# Patient Record
Sex: MALE | Race: WHITE | NOT HISPANIC OR LATINO | Employment: OTHER | URBAN - NONMETROPOLITAN AREA
[De-identification: names, ages, dates, MRNs, and addresses within clinical notes are randomized per-mention and may not be internally consistent; named-entity substitution may affect disease eponyms.]

---

## 2023-08-30 ENCOUNTER — TELEPHONE (OUTPATIENT)
Dept: CARDIOLOGY | Facility: CLINIC | Age: 63
End: 2023-08-30
Payer: MEDICARE

## 2023-08-30 NOTE — LETTER
September 6, 2023       No Recipients    Patient: Andre Witt   YOB: 1960   Date of Visit: 8/30/2023 September 6, 2023       To Whom It May Concern:    We build our practice on integrity and patient care. The highest compliment we can receive is the referral of friends, family and patients to our office. We want to take this time to thank you for considering our group as part of your care team.    The medical records for Andre Witt 1960 were reviewed for pre-operative clearance on 09/06/2023. It has been determined that this patient has:  ACCEPTABLE RISK.    Andre Witt is currently on the following medications that will need to be held prior to surgery: MAY HOLD PLAVIX TO 7 DAYS PRIOR TO PROCEDURE.    This pre-operative evaluation has been completed based on patient reported medical conditions at the date of this letter, this evaluation may have considered in part results of additional testing, completion of an EKG and patient reported symptoms at the time of their visit.    Andre Witt's pre-operative clearance is good for thirty(30) days from the date of this letter with no reported changes in health, symptoms or diagnosis from the patient, their primary care provider or your office.    Your office has reported the patient will under go 10/04/2023 on LEFT TOTAL KNEE REPLACEMENT with Dr. SPEARS. If this appointment is changed or moved outside of thirty(30) days from 09/06/2023 this pre-operative clearance is void.    If you have any further questions please give our office a call.    Thank you for your trust,      PEPPER Sanchez

## 2023-08-30 NOTE — TELEPHONE ENCOUNTER
Received cardiac clearance request from  stating pt has left total knee replacement scheduled for 10/04/2023 and is requiring a cardiac clearance. Placed cardiac clearance request in Shira's inbox to review and address with provider.

## 2023-08-31 ENCOUNTER — OFFICE VISIT (OUTPATIENT)
Dept: CARDIOLOGY | Facility: CLINIC | Age: 63
End: 2023-08-31
Payer: MEDICARE

## 2023-08-31 VITALS
DIASTOLIC BLOOD PRESSURE: 81 MMHG | HEIGHT: 67 IN | WEIGHT: 211.6 LBS | BODY MASS INDEX: 33.21 KG/M2 | HEART RATE: 80 BPM | SYSTOLIC BLOOD PRESSURE: 124 MMHG | OXYGEN SATURATION: 98 %

## 2023-08-31 DIAGNOSIS — I10 ESSENTIAL HYPERTENSION: ICD-10-CM

## 2023-08-31 DIAGNOSIS — I25.10 CORONARY ARTERY DISEASE INVOLVING NATIVE CORONARY ARTERY OF NATIVE HEART WITHOUT ANGINA PECTORIS: Primary | ICD-10-CM

## 2023-08-31 DIAGNOSIS — E78.5 HYPERLIPIDEMIA, UNSPECIFIED HYPERLIPIDEMIA TYPE: ICD-10-CM

## 2023-08-31 RX ORDER — LORATADINE 10 MG/1
10 TABLET ORAL DAILY
COMMUNITY

## 2023-08-31 RX ORDER — FLUTICASONE PROPIONATE 50 MCG
2 SPRAY, SUSPENSION (ML) NASAL DAILY
COMMUNITY

## 2023-08-31 RX ORDER — DICLOFENAC SODIUM 75 MG/1
TABLET, DELAYED RELEASE ORAL
COMMUNITY
Start: 2023-07-24

## 2023-08-31 RX ORDER — ROSUVASTATIN CALCIUM 40 MG/1
TABLET, COATED ORAL
COMMUNITY
Start: 2023-06-19

## 2023-08-31 RX ORDER — LOSARTAN POTASSIUM 50 MG/1
TABLET ORAL
COMMUNITY
Start: 2023-06-19

## 2023-08-31 RX ORDER — CLOPIDOGREL BISULFATE 75 MG/1
TABLET ORAL
COMMUNITY
Start: 2023-06-19

## 2023-08-31 RX ORDER — ALBUTEROL SULFATE 90 UG/1
2 AEROSOL, METERED RESPIRATORY (INHALATION) EVERY 4 HOURS PRN
COMMUNITY

## 2023-08-31 RX ORDER — SODIUM PHOSPHATE,MONO-DIBASIC 19G-7G/118
ENEMA (ML) RECTAL
COMMUNITY

## 2023-08-31 RX ORDER — PAROXETINE 30 MG/1
TABLET, FILM COATED ORAL
COMMUNITY
Start: 2023-06-19

## 2023-08-31 RX ORDER — MELATONIN
1000 DAILY
COMMUNITY

## 2023-08-31 RX ORDER — OMEPRAZOLE 20 MG/1
CAPSULE, DELAYED RELEASE ORAL
COMMUNITY
Start: 2023-06-19

## 2023-08-31 RX ORDER — METOPROLOL SUCCINATE 50 MG/1
TABLET, EXTENDED RELEASE ORAL
COMMUNITY
Start: 2023-06-19

## 2023-08-31 NOTE — PROGRESS NOTES
Subjective     Andre Witt is a 63 y.o. male who presents today for Establish Care (Cardiac Eval.).    CHIEF COMPLIANT  Chief Complaint   Patient presents with    Establish Care     Cardiac Eval.     Problem list:   CAD: STEMI 10/2021: 100% proximal OM1, stented.  RCA  with good collateral filling, residual mid 50% LAD  HTN  HLD  Echo 2/2022: EF improved to 65% ( was 35% on [facility] after STEMI)    Active Problems:  Problem List Items Addressed This Visit    None  Visit Diagnoses       Coronary artery disease involving native coronary artery of native heart without angina pectoris    -  Primary    Relevant Medications    clopidogrel (PLAVIX) 75 MG tablet    metoprolol succinate XL (TOPROL-XL) 50 MG 24 hr tablet    Other Relevant Orders    ECG 12 Lead    Essential hypertension        Relevant Medications    losartan (COZAAR) 50 MG tablet    metoprolol succinate XL (TOPROL-XL) 50 MG 24 hr tablet    Hyperlipidemia, unspecified hyperlipidemia type        Relevant Medications    rosuvastatin (CRESTOR) 40 MG tablet            HPI  HPI  The patient is a 63 year old male with a history of CAD with STEMI in 10/2021 receiving stent to proximal OM 1.  He has done well since that time.  Denies chest pain, SOA, syncope, near syncope, palpitations, orthopnea, PND.  Last echo in 2/2022 showed EF improved to 60-65%.  Initially during cath from STEMi, EF appeared to be 35-40%.  This has recovered per echo prior to discharge.  He is scheduled for knee replacement soon and needs cardiac clearance prior to procedure.  He is somewhat limited by knee but is able to perform normal household work without limitations.  HR and BP stable. EKG shows sinus bradycardia, rate 59, inferior q waves,  no acute ischemic changes.         PRIOR MEDS  Current Outpatient Medications on File Prior to Visit   Medication Sig Dispense Refill    albuterol sulfate  (90 Base) MCG/ACT inhaler Inhale 2 puffs Every 4 (Four) Hours As Needed for Wheezing.       Cholecalciferol 25 MCG (1000 UT) tablet Take 1 tablet by mouth Daily.      clopidogrel (PLAVIX) 75 MG tablet       diclofenac (VOLTAREN) 75 MG EC tablet       fluticasone (FLONASE) 50 MCG/ACT nasal spray 2 sprays into the nostril(s) as directed by provider Daily.      glucosamine-chondroitin 500-400 MG capsule capsule Take  by mouth 3 (Three) Times a Day With Meals.      loratadine (Claritin) 10 MG tablet Take 1 tablet by mouth Daily.      losartan (COZAAR) 50 MG tablet       metoprolol succinate XL (TOPROL-XL) 50 MG 24 hr tablet       omeprazole (priLOSEC) 20 MG capsule       PARoxetine (PAXIL) 30 MG tablet       rosuvastatin (CRESTOR) 40 MG tablet        No current facility-administered medications on file prior to visit.       ALLERGIES  Celebrex [celecoxib] and Codeine    HISTORY  Past Medical History:   Diagnosis Date    Asthma     Heart attack     x2    History of hernia repair     History of total hip replacement     bilateral hips    Hx of blood clots     upper RT thigh toward groin area    Hypertension        Social History     Socioeconomic History    Marital status:    Tobacco Use    Smoking status: Never    Smokeless tobacco: Never   Substance and Sexual Activity    Alcohol use: Never    Drug use: Never    Sexual activity: Defer       Family History   Problem Relation Age of Onset    Stroke Mother         multiple strokes    Diabetes Mother     Diabetes Father     Heart failure Father     Heart disease Father     Heart attack Father         open heart surgery    Prostate cancer Father     Hyperlipidemia Sister     Diabetes Sister     No Known Problems Brother     No Known Problems Maternal Grandmother     No Known Problems Maternal Grandfather     No Known Problems Paternal Grandmother     No Known Problems Paternal Grandfather        Review of Systems   Constitutional:  Positive for fatigue. Negative for chills, diaphoresis and fever.   HENT: Negative.  Positive for tinnitus.    Eyes:  "Negative.  Negative for visual disturbance (glasses).   Respiratory:  Positive for shortness of breath (with exertion) and wheezing. Negative for apnea, cough and chest tightness.    Cardiovascular: Negative.  Negative for chest pain, palpitations and leg swelling.   Gastrointestinal: Negative.  Negative for blood in stool.   Endocrine: Negative.  Negative for cold intolerance and heat intolerance.   Genitourinary: Negative.  Negative for hematuria.   Musculoskeletal:  Positive for arthralgias, back pain and myalgias (calves and hands). Negative for neck pain and neck stiffness.   Skin: Negative.  Negative for color change, rash and wound.   Allergic/Immunologic: Positive for environmental allergies (hay fever). Negative for food allergies.   Neurological:  Positive for dizziness (occas. when getting up out of bed), light-headedness, numbness (Left foot) and headaches (occas.). Negative for syncope and weakness.   Hematological:  Bruises/bleeds easily.   Psychiatric/Behavioral:  Positive for sleep disturbance.      Objective     VITALS: /81 (BP Location: Left arm, Patient Position: Sitting)   Pulse 80   Ht 170.2 cm (67\")   Wt 96 kg (211 lb 9.6 oz)   SpO2 98%   BMI 33.14 kg/mý     LABS:   Lab Results (most recent)       None            IMAGING:   No Images in the past 120 days found..    EXAM:  Physical Exam  Constitutional:       Appearance: Normal appearance.   Eyes:      Pupils: Pupils are equal, round, and reactive to light.   Cardiovascular:      Rate and Rhythm: Normal rate and regular rhythm.      Pulses:           Carotid pulses are 2+ on the right side and 2+ on the left side.       Radial pulses are 2+ on the right side and 2+ on the left side.        Dorsalis pedis pulses are 2+ on the right side and 2+ on the left side.        Posterior tibial pulses are 2+ on the right side and 2+ on the left side.      Heart sounds: Normal heart sounds.   Pulmonary:      Effort: Pulmonary effort is normal.    "   Breath sounds: Normal breath sounds.   Abdominal:      General: Bowel sounds are normal.      Palpations: Abdomen is soft.   Musculoskeletal:      Right lower leg: No edema.      Left lower leg: No edema.   Skin:     General: Skin is warm and dry.      Capillary Refill: Capillary refill takes less than 2 seconds.   Neurological:      General: No focal deficit present.      Mental Status: He is alert and oriented to person, place, and time.   Psychiatric:         Mood and Affect: Mood normal.         Thought Content: Thought content normal.       Procedure     ECG 12 Lead    Date/Time: 8/31/2023 10:13 AM  Performed by: Iman Ragland APRN  Authorized by: Iman Ragland APRN   Comparison: compared with previous ECG from 10/30/2021  Rhythm: sinus bradycardia  Rate: bradycardic  BPM: 59  QRS axis: left  Comments: Qtc 413 ms, no acute changes, inferior q waves           Assessment & Plan    Diagnosis Plan   1. Coronary artery disease involving native coronary artery of native heart without angina pectoris  ECG 12 Lead      2. Essential hypertension        3. Hyperlipidemia, unspecified hyperlipidemia type        Plan:  CAD: Stable from CV standpoint.  Continue current medications including aspirin, Plavix, Toprol, losartan, and Crestor.  He was advised to notify our office if any symptoms of chest pain or shortness of breath develop  Essential hypertension: The patient's blood pressure is under good control with current medications.  Continue Toprol and losartan.  The patient was instructed to monitor BP at home and to notify our office if systolic BP is consistently greater than 130-135 systolic.  Goal BP is 130-135/70-80.  Hyperlipidemia: Continue statin.  Reviewed most recent lipid panel from June 2023 that was completed at his PCP office.  Overall cholesterol 114, HDL 33, LDL 46.    He can be cleared for upcoming knee replacement surgery at acceptable risk from cardiovascular standpoint taking usual  precautions.  Can hold Plavix and aspirin for 5 to 7 days prior to procedure and resume postoperatively once cleared by orthopedic surgeon.  We will plan to see the patient back in 1 year, sooner if needed depending on symptoms.    Return in about 1 year (around 8/31/2024).    Diagnoses and all orders for this visit:    1. Coronary artery disease involving native coronary artery of native heart without angina pectoris (Primary)  -     ECG 12 Lead    2. Essential hypertension    3. Hyperlipidemia, unspecified hyperlipidemia type           Patient did not bring med list or medicine bottles to appointment, med list has been reviewed and updated based on patient's knowledge of their meds.       Advance Care Planning   ACP discussion was declined by the patient. Patient does not have an advance directive, declines further assistance.              MEDS ORDERED DURING VISIT:  No orders of the defined types were placed in this encounter.      DISCONTINUED MEDS DURING VISIT:   There are no discontinued medications.       This document has been electronically signed by PEPPER Vidales  September 3, 2023 11:24 EDT    Dictated Utilizing Dragon Dictation: Part of this note may be an electronic transcription/translation of spoken language to printed text using the Dragon Dictation System

## 2023-09-03 PROBLEM — I25.10 CORONARY ARTERY DISEASE INVOLVING NATIVE CORONARY ARTERY OF NATIVE HEART WITHOUT ANGINA PECTORIS: Status: ACTIVE | Noted: 2023-09-03

## 2023-09-03 PROBLEM — I10 ESSENTIAL HYPERTENSION: Status: ACTIVE | Noted: 2023-09-03

## 2023-09-03 PROBLEM — E78.5 HYPERLIPIDEMIA: Status: ACTIVE | Noted: 2023-09-03

## 2024-01-24 RX ORDER — ROSUVASTATIN CALCIUM 40 MG/1
40 TABLET, COATED ORAL DAILY
Qty: 90 TABLET | Refills: 3 | Status: SHIPPED | OUTPATIENT
Start: 2024-01-24

## 2024-01-24 RX ORDER — LOSARTAN POTASSIUM 50 MG/1
50 TABLET ORAL DAILY
Qty: 90 TABLET | Refills: 3 | Status: SHIPPED | OUTPATIENT
Start: 2024-01-24

## 2024-01-24 RX ORDER — CLOPIDOGREL BISULFATE 75 MG/1
75 TABLET ORAL DAILY
Qty: 90 TABLET | Refills: 3 | Status: SHIPPED | OUTPATIENT
Start: 2024-01-24

## 2024-01-24 RX ORDER — METOPROLOL SUCCINATE 50 MG/1
50 TABLET, EXTENDED RELEASE ORAL DAILY
Qty: 90 TABLET | Refills: 3 | Status: SHIPPED | OUTPATIENT
Start: 2024-01-24

## 2024-08-30 ENCOUNTER — OFFICE VISIT (OUTPATIENT)
Dept: CARDIOLOGY | Facility: CLINIC | Age: 64
End: 2024-08-30
Payer: MEDICARE

## 2024-08-30 VITALS
OXYGEN SATURATION: 95 % | HEART RATE: 55 BPM | HEIGHT: 67 IN | WEIGHT: 191.8 LBS | BODY MASS INDEX: 30.1 KG/M2 | SYSTOLIC BLOOD PRESSURE: 112 MMHG | DIASTOLIC BLOOD PRESSURE: 67 MMHG

## 2024-08-30 DIAGNOSIS — I25.10 CORONARY ARTERY DISEASE INVOLVING NATIVE CORONARY ARTERY OF NATIVE HEART WITHOUT ANGINA PECTORIS: Primary | ICD-10-CM

## 2024-08-30 DIAGNOSIS — I10 ESSENTIAL HYPERTENSION: ICD-10-CM

## 2024-08-30 DIAGNOSIS — E78.5 HYPERLIPIDEMIA, UNSPECIFIED HYPERLIPIDEMIA TYPE: ICD-10-CM

## 2024-08-30 PROCEDURE — 99214 OFFICE O/P EST MOD 30 MIN: CPT | Performed by: CLINICAL NURSE SPECIALIST

## 2024-08-30 PROCEDURE — 3078F DIAST BP <80 MM HG: CPT | Performed by: CLINICAL NURSE SPECIALIST

## 2024-08-30 PROCEDURE — 3074F SYST BP LT 130 MM HG: CPT | Performed by: CLINICAL NURSE SPECIALIST

## 2024-08-30 RX ORDER — LOSARTAN POTASSIUM 25 MG/1
25 TABLET ORAL DAILY
Qty: 90 TABLET | Refills: 3 | Status: SHIPPED | OUTPATIENT
Start: 2024-08-30

## 2024-08-30 RX ORDER — MULTIPLE VITAMINS W/ MINERALS TAB 9MG-400MCG
1 TAB ORAL DAILY
COMMUNITY

## 2024-08-30 RX ORDER — METOPROLOL SUCCINATE 50 MG/1
50 TABLET, EXTENDED RELEASE ORAL DAILY
Qty: 90 TABLET | Refills: 3 | Status: SHIPPED | OUTPATIENT
Start: 2024-08-30

## 2024-08-30 RX ORDER — CLOPIDOGREL BISULFATE 75 MG/1
75 TABLET ORAL DAILY
Qty: 90 TABLET | Refills: 3 | Status: SHIPPED | OUTPATIENT
Start: 2024-08-30

## 2024-08-30 RX ORDER — ROSUVASTATIN CALCIUM 40 MG/1
40 TABLET, COATED ORAL DAILY
Qty: 90 TABLET | Refills: 3 | Status: SHIPPED | OUTPATIENT
Start: 2024-08-30

## 2024-08-30 NOTE — PROGRESS NOTES
Subjective     Andre Witt is a 64 y.o. male who presents today for Follow-up (Yearly follow up).    CHIEF COMPLIANT  Chief Complaint   Patient presents with    Follow-up     Yearly follow up       Active Problems:  CAD: STEMI 10/2021: 100% proximal OM1, stented.  RCA  with good collateral filling, residual mid 50% LAD  HTN  HLD  Echo 2/2022: EF improved to 65% ( was 35% on [facility] after STEMI)    HPI  The patient is a 64-year-old male that returns for routine follow-up.  Overall the patient states he is doing well.  He denies chest pain, dyspnea, syncope, near syncope or palpitations.  The patient has lost around 20 to 25 pounds.  He has noted his blood pressure has been running a little low and he has had some orthostatic dizziness.  We did review his most recent lipid panel during the visit.  Overall cholesterol 126, triglycerides 114, HDL 43, LDL 60.     PRIOR MEDS  Current Outpatient Medications on File Prior to Visit   Medication Sig Dispense Refill    albuterol sulfate  (90 Base) MCG/ACT inhaler Inhale 2 puffs Every 4 (Four) Hours As Needed for Wheezing.      Cholecalciferol 25 MCG (1000 UT) tablet Take 1 tablet by mouth Daily.      diclofenac (VOLTAREN) 75 MG EC tablet       fluticasone (FLONASE) 50 MCG/ACT nasal spray 2 sprays into the nostril(s) as directed by provider Daily.      glucosamine-chondroitin 500-400 MG capsule capsule Take  by mouth 3 (Three) Times a Day With Meals.      loratadine (Claritin) 10 MG tablet Take 1 tablet by mouth Daily.      multivitamin with minerals tablet tablet Take 1 tablet by mouth Daily.      omeprazole (priLOSEC) 20 MG capsule       PARoxetine (PAXIL) 30 MG tablet       [DISCONTINUED] clopidogrel (PLAVIX) 75 MG tablet Take 1 tablet by mouth Daily. 90 tablet 3    [DISCONTINUED] losartan (COZAAR) 50 MG tablet Take 1 tablet by mouth Daily. 90 tablet 3    [DISCONTINUED] metoprolol succinate XL (TOPROL-XL) 50 MG 24 hr tablet Take 1 tablet by mouth Daily. 90 tablet 3     [DISCONTINUED] rosuvastatin (CRESTOR) 40 MG tablet Take 1 tablet by mouth Daily. 90 tablet 3     No current facility-administered medications on file prior to visit.       ALLERGIES  Celebrex [celecoxib] and Codeine    HISTORY  Past Medical History:   Diagnosis Date    Asthma     Heart attack     x2    History of hernia repair     History of total hip replacement     bilateral hips    Hx of blood clots     upper RT thigh toward groin area    Hypertension        Social History     Socioeconomic History    Marital status:    Tobacco Use    Smoking status: Never    Smokeless tobacco: Never   Substance and Sexual Activity    Alcohol use: Never    Drug use: Never    Sexual activity: Defer       Family History   Problem Relation Age of Onset    Stroke Mother         multiple strokes    Diabetes Mother     Diabetes Father     Heart failure Father     Heart disease Father     Heart attack Father         open heart surgery    Prostate cancer Father     Hyperlipidemia Sister     Diabetes Sister     No Known Problems Brother     No Known Problems Maternal Grandmother     No Known Problems Maternal Grandfather     No Known Problems Paternal Grandmother     No Known Problems Paternal Grandfather        Review of Systems   Constitutional:  Negative for fever.   HENT:  Positive for rhinorrhea. Negative for congestion, postnasal drip, sinus pressure and sinus pain.    Respiratory:  Negative for chest tightness and shortness of breath.    Cardiovascular:  Negative for chest pain, palpitations and leg swelling.   Gastrointestinal: Negative.    Genitourinary: Negative.    Allergic/Immunologic: Positive for environmental allergies.   Neurological:  Positive for dizziness. Negative for syncope and headaches.   Hematological:  Bruises/bleeds easily.   Psychiatric/Behavioral:  Positive for sleep disturbance (troiuble falling asleep).        Objective     VITALS: /67 (BP Location: Left arm, Patient Position: Sitting, Cuff  "Size: Adult)   Pulse 55   Ht 170.2 cm (67\")   Wt 87 kg (191 lb 12.8 oz)   SpO2 95%   BMI 30.04 kg/m²     LABS:   Lab Results (most recent)       None            IMAGING:   No Images in the past 120 days found..    EXAM:  Physical Exam  Constitutional:       Appearance: Normal appearance.   Eyes:      Pupils: Pupils are equal, round, and reactive to light.   Cardiovascular:      Rate and Rhythm: Normal rate and regular rhythm.      Pulses:           Carotid pulses are 2+ on the right side and 2+ on the left side.       Radial pulses are 2+ on the right side and 2+ on the left side.        Dorsalis pedis pulses are 2+ on the right side and 2+ on the left side.        Posterior tibial pulses are 2+ on the right side and 2+ on the left side.      Heart sounds: Normal heart sounds.   Pulmonary:      Effort: Pulmonary effort is normal.      Breath sounds: Normal breath sounds.   Abdominal:      General: Bowel sounds are normal.      Palpations: Abdomen is soft.   Musculoskeletal:      Right lower leg: No edema.      Left lower leg: No edema.   Skin:     General: Skin is warm and dry.      Capillary Refill: Capillary refill takes less than 2 seconds.   Neurological:      General: No focal deficit present.      Mental Status: He is alert and oriented to person, place, and time.   Psychiatric:         Mood and Affect: Mood normal.         Thought Content: Thought content normal.         Procedure   Procedures       Assessment & Plan    Diagnosis Plan   1. Coronary artery disease involving native coronary artery of native heart without angina pectoris        2. Essential hypertension        3. Hyperlipidemia, unspecified hyperlipidemia type          Plan:  CAD: Patient is stable from a cardiovascular standpoint.  Will continue current medical management including aspirin, Plavix, Toprol, losartan, and simvastatin.  He was advised to notify our office if any symptoms of chest pain or shortness of breath develop "   Essential hypertension: Patient's blood pressure is running a little low today.  He has had some orthostatic dizziness.  He has lost around 20 to 25 pounds which is likely causing the symptoms.  Will decrease losartan to 25 mg daily.  Continue Toprol.  I have asked him to monitor his blood pressure closely.  If his blood pressure continues to run low and he continues to have dizziness he was advised to notify our office.  We will continue to adjust medications as needed.    The patient was instructed to monitor BP at home and to notify our office if systolic BP is consistently greater than 130-135 systolic.  Goal BP is 130-135/70-80.  Hyperlipidemia: Continue statin.  Recent lipid panel shows good control.  Will periodically review lipid panel.    Return in about 1 year (around 8/30/2025).    Andre Witt  reports that he has never smoked. He has never used smokeless tobacco.      Advance Care Planning   ACP discussion was declined by the patient. Patient does not have an advance directive, declines further assistance.           BMI is >= 30 and <35. (Class 1 Obesity). The following options were offered after discussion;: referral to primary care           MEDS ORDERED DURING VISIT:  New Medications Ordered This Visit   Medications    losartan (COZAAR) 25 MG tablet     Sig: Take 1 tablet by mouth Daily.     Dispense:  90 tablet     Refill:  3    clopidogrel (PLAVIX) 75 MG tablet     Sig: Take 1 tablet by mouth Daily.     Dispense:  90 tablet     Refill:  3    metoprolol succinate XL (TOPROL-XL) 50 MG 24 hr tablet     Sig: Take 1 tablet by mouth Daily.     Dispense:  90 tablet     Refill:  3    rosuvastatin (CRESTOR) 40 MG tablet     Sig: Take 1 tablet by mouth Daily.     Dispense:  90 tablet     Refill:  3       DISCONTINUED MEDS DURING VISIT:   Medications Discontinued During This Encounter   Medication Reason    clopidogrel (PLAVIX) 75 MG tablet Reorder    losartan (COZAAR) 50 MG tablet Reorder    metoprolol  succinate XL (TOPROL-XL) 50 MG 24 hr tablet Reorder    rosuvastatin (CRESTOR) 40 MG tablet Reorder          This document has been electronically signed by PEPPER Vidales  August 30, 2024 11:26 EDT    Dictated Utilizing Dragon Dictation: Part of this note may be an electronic transcription/translation of spoken language to printed text using the Dragon Dictation System

## 2024-11-13 DIAGNOSIS — R06.09 DYSPNEA ON EXERTION: Primary | ICD-10-CM

## 2024-11-26 ENCOUNTER — TELEPHONE (OUTPATIENT)
Dept: CARDIOLOGY | Facility: CLINIC | Age: 64
End: 2024-11-26
Payer: MEDICARE

## 2024-11-26 NOTE — TELEPHONE ENCOUNTER
Received cardiac clearance request from DR KIT KINGSLEY stating pt has COLONOSCOPY scheduled for 12/03/2024 and is requiring a cardiac clearance. Placed cardiac clearance request in LEE'S inbox to review and address with provider.

## 2024-11-26 NOTE — TELEPHONE ENCOUNTER
REQUEST FOR CARDIAC CLEARANCE    Caller name: Andre Witt     Phone Number: [phone]     Surgeon's name: DR. KIT KINGSLEY    Type of planned surgery: COLONSCOPY    Date of planned surgery: 12.3.24    Type of anesthesia: GENERAL    Have you been experiencing chest pain or shortness of breath? NO    Is your doctor requesting for you to stop any of your medications prior to your surgery? PLAVIX 5-7 DAYS PRIOR    Where should we fax the clearance to? [phone] ATTN: ELISABETH      THEY HAVE FAXED THIS OVER TO OUR OFFICE MULTIPLE TIMES WITH NO SUCCESS. PLEASE ADVISE

## 2024-12-13 ENCOUNTER — HOSPITAL ENCOUNTER (OUTPATIENT)
Dept: CARDIOLOGY | Facility: HOSPITAL | Age: 64
Discharge: HOME | End: 2024-12-13
Payer: MEDICARE

## 2024-12-13 VITALS — BODY MASS INDEX: 30.1 KG/M2 | WEIGHT: 191.8 LBS | HEIGHT: 67 IN

## 2024-12-13 DIAGNOSIS — R06.09 DYSPNEA ON EXERTION: ICD-10-CM

## 2024-12-13 PROCEDURE — 93306 TTE W/DOPPLER COMPLETE: CPT

## 2024-12-15 LAB
AORTIC DIMENSIONLESS INDEX: 0.84 (DI)
BH CV ECHO MEAS - ACS: 2.15 CM
BH CV ECHO MEAS - AO MAX PG: 4.1 MMHG
BH CV ECHO MEAS - AO MEAN PG: 2.22 MMHG
BH CV ECHO MEAS - AO ROOT DIAM: 3.6 CM
BH CV ECHO MEAS - AO V2 MAX: 101.4 CM/SEC
BH CV ECHO MEAS - AO V2 VTI: 23.7 CM
BH CV ECHO MEAS - EDV(CUBED): 94.4 ML
BH CV ECHO MEAS - EF_3D-VOL: 62 %
BH CV ECHO MEAS - ESV(CUBED): 22.1 ML
BH CV ECHO MEAS - FS: 38.4 %
BH CV ECHO MEAS - IVS/LVPW: 0.86 CM
BH CV ECHO MEAS - IVSD: 0.98 CM
BH CV ECHO MEAS - LA DIMENSION: 3.9 CM
BH CV ECHO MEAS - LAT PEAK E' VEL: 9.3 CM/SEC
BH CV ECHO MEAS - LV MASS(C)D: 169 GRAMS
BH CV ECHO MEAS - LV MAX PG: 2.7 MMHG
BH CV ECHO MEAS - LV MEAN PG: 1.38 MMHG
BH CV ECHO MEAS - LV V1 MAX: 81.7 CM/SEC
BH CV ECHO MEAS - LV V1 VTI: 19.9 CM
BH CV ECHO MEAS - LVIDD: 4.6 CM
BH CV ECHO MEAS - LVIDS: 2.8 CM
BH CV ECHO MEAS - LVPWD: 1.14 CM
BH CV ECHO MEAS - MED PEAK E' VEL: 5.3 CM/SEC
BH CV ECHO MEAS - MV A MAX VEL: 47.9 CM/SEC
BH CV ECHO MEAS - MV DEC SLOPE: 287.8 CM/SEC2
BH CV ECHO MEAS - MV DEC TIME: 0.24 SEC
BH CV ECHO MEAS - MV E MAX VEL: 52.4 CM/SEC
BH CV ECHO MEAS - MV E/A: 1.09
BH CV ECHO MEAS - MV MAX PG: 2.1 MMHG
BH CV ECHO MEAS - MV MEAN PG: 0.81 MMHG
BH CV ECHO MEAS - MV P1/2T: 68.8 MSEC
BH CV ECHO MEAS - MV V2 VTI: 26 CM
BH CV ECHO MEAS - MVA(P1/2T): 3.2 CM2
BH CV ECHO MEAS - PA V2 MAX: 106.3 CM/SEC
BH CV ECHO MEAS - RV MAX PG: 1.11 MMHG
BH CV ECHO MEAS - RV V1 MAX: 52.8 CM/SEC
BH CV ECHO MEAS - RV V1 VTI: 13.8 CM
BH CV ECHO MEAS - RVDD: 3.4 CM
BH CV ECHO MEAS - TAPSE (>1.6): 2.5 CM
BH CV ECHO MEASUREMENTS AVERAGE E/E' RATIO: 7.18
BH CV XLRA - TDI S': 11.7 CM/SEC
SINUS: 3.5 CM

## 2024-12-18 ENCOUNTER — TELEPHONE (OUTPATIENT)
Dept: CARDIOLOGY | Facility: CLINIC | Age: 64
End: 2024-12-18
Payer: MEDICARE

## 2024-12-18 DIAGNOSIS — R94.30 EJECTION FRACTION < 50%: Primary | ICD-10-CM

## 2024-12-18 DIAGNOSIS — R94.31 ABNORMAL ELECTROCARDIOGRAM (ECG) (EKG): ICD-10-CM

## 2024-12-18 NOTE — TELEPHONE ENCOUNTER
Caller: Andre Witt    Relationship: Self    Best call back number: [phone]    What is the best time to reach you: ANYTIME. VM OKAY.    Who are you requesting to speak with (clinical staff, provider,  specific staff member): PROVIDER    Do you know the name of the person who called: NA    What was the call regarding: PT WOULD LIKE TO RE GO OVER TEST RESULTS. PT WAS HALF ASLEEP THIS MORNING AND DID NOT FULLY UNDERSTAND.     Is it okay if the provider responds through MyChart: NO

## 2024-12-18 NOTE — TELEPHONE ENCOUNTER
----- Message from Iman Ragland sent at 12/17/2024 12:27 PM EST -----  Low normal to mildly depressed EF at 45-50%.  A little lower than last but there was some poor visibility on the study.  I would like to do a MUGA to confirm EF.  If patient agreeable please order or let me know and I can enter the order.  Thanks

## 2024-12-18 NOTE — TELEPHONE ENCOUNTER
Called and informed pt of results and message from josé, he verbalized understanding and was agreeable.

## 2024-12-19 ENCOUNTER — TELEPHONE (OUTPATIENT)
Dept: CARDIOLOGY | Facility: CLINIC | Age: 64
End: 2024-12-19
Payer: MEDICARE

## 2024-12-19 NOTE — TELEPHONE ENCOUNTER
Pt called and text Iman's cellphone again.     I asked if she wanted me to answer and speak w/ pt, as she was unavailable.    I answered her phone and informed pt that Iman is unavailable and that he needs to contact us at our office number for assistance and asked what I could do for him. He stated he wanted to speak w/ Iman, not her assistant. I explained that we need all pt calls to come through the office for proper documentation and HIPAA, pt stated Iman was his friend and he would speak w/ her directly. I asked if he needed anything, as she's unavailable, he declined.

## 2024-12-19 NOTE — TELEPHONE ENCOUNTER
Pt called provider's cell again, she is with patients and unable to answer. Pt needs to call the office for assistance.       Second attempt to reach pt. Left a voicemail for pt stating to call the office for assistance at [phone]. Stated provider cannot answer her cell and calls need to come through the office.       RELAY    Please advise pt that Iman is unable to answer her cell and that for documentation and minimize delays, calls need to come through the office.   Ask what we can assist him with?

## 2025-02-17 ENCOUNTER — HOSPITAL ENCOUNTER (OUTPATIENT)
Dept: CARDIOLOGY | Facility: HOSPITAL | Age: 65
Discharge: HOME | End: 2025-02-17
Payer: MEDICARE

## 2025-02-17 DIAGNOSIS — R94.31 ABNORMAL ELECTROCARDIOGRAM (ECG) (EKG): ICD-10-CM

## 2025-02-17 DIAGNOSIS — R94.30 EJECTION FRACTION < 50%: ICD-10-CM

## 2025-02-17 PROCEDURE — A9560 TC99M LABELED RBC: HCPCS | Performed by: INTERNAL MEDICINE

## 2025-02-17 PROCEDURE — 78472 GATED HEART PLANAR SINGLE: CPT

## 2025-02-17 PROCEDURE — 34310000005 TECHNETIUM LABELED RED BLOOD CELLS: Performed by: INTERNAL MEDICINE

## 2025-02-17 RX ADMIN — TECHNETIUM TC 99M-LABELED RED BLOOD CELLS 1 DOSE: KIT at 14:45

## 2025-02-19 ENCOUNTER — DOCUMENTATION (OUTPATIENT)
Dept: CARDIOLOGY | Facility: CLINIC | Age: 65
End: 2025-02-19
Payer: MEDICARE

## 2025-02-19 RX ORDER — SACUBITRIL AND VALSARTAN 24; 26 MG/1; MG/1
1 TABLET, FILM COATED ORAL 2 TIMES DAILY
Qty: 60 TABLET | Refills: 3 | Status: SHIPPED | OUTPATIENT
Start: 2025-02-19

## 2025-02-20 ENCOUNTER — TELEPHONE (OUTPATIENT)
Dept: CARDIOLOGY | Facility: CLINIC | Age: 65
End: 2025-02-20
Payer: MEDICARE

## 2025-02-20 NOTE — TELEPHONE ENCOUNTER
----- Message from Iman Ragland sent at 2/19/2025  3:05 PM EST -----  Regarding: FW:  I have called the patient his results.  I stopped losartan and started him on Entresto.  I added Jardiance.  I sent medications to his pharmacy.  He needs an appt to see me in 1-2 weeks. He will want his appt with me.  ----- Message -----  From: Jean Paul Cerrato MD  Sent: 2/19/2025   1:38 PM EST  To: PEPPER Sanchez

## 2025-02-25 ENCOUNTER — OFFICE VISIT (OUTPATIENT)
Dept: CARDIOLOGY | Facility: CLINIC | Age: 65
End: 2025-02-25
Payer: MEDICARE

## 2025-02-25 VITALS
BODY MASS INDEX: 31.04 KG/M2 | WEIGHT: 197.8 LBS | HEIGHT: 67 IN | OXYGEN SATURATION: 96 % | HEART RATE: 64 BPM | SYSTOLIC BLOOD PRESSURE: 110 MMHG | DIASTOLIC BLOOD PRESSURE: 68 MMHG

## 2025-02-25 DIAGNOSIS — I25.10 CORONARY ARTERY DISEASE INVOLVING NATIVE CORONARY ARTERY OF NATIVE HEART WITHOUT ANGINA PECTORIS: ICD-10-CM

## 2025-02-25 DIAGNOSIS — I50.21 ACUTE HFREF (HEART FAILURE WITH REDUCED EJECTION FRACTION): ICD-10-CM

## 2025-02-25 DIAGNOSIS — E78.5 HYPERLIPIDEMIA, UNSPECIFIED HYPERLIPIDEMIA TYPE: ICD-10-CM

## 2025-02-25 DIAGNOSIS — R06.09 DYSPNEA ON EXERTION: ICD-10-CM

## 2025-02-25 DIAGNOSIS — I42.9 CARDIOMYOPATHY, UNSPECIFIED TYPE: Primary | ICD-10-CM

## 2025-02-25 DIAGNOSIS — R53.83 OTHER FATIGUE: ICD-10-CM

## 2025-02-25 DIAGNOSIS — I10 ESSENTIAL HYPERTENSION: ICD-10-CM

## 2025-02-25 RX ORDER — SACUBITRIL AND VALSARTAN 24; 26 MG/1; MG/1
1 TABLET, FILM COATED ORAL 2 TIMES DAILY
Qty: 28 TABLET | Refills: 0 | COMMUNITY
Start: 2025-02-25 | End: 2025-02-25 | Stop reason: SDUPTHER

## 2025-02-25 RX ORDER — SACUBITRIL AND VALSARTAN 24; 26 MG/1; MG/1
1 TABLET, FILM COATED ORAL 2 TIMES DAILY
Qty: 28 TABLET | Refills: 0 | COMMUNITY
Start: 2025-02-25

## 2025-02-25 RX ORDER — NITROGLYCERIN 0.4 MG/1
TABLET SUBLINGUAL
Qty: 30 TABLET | Refills: 5 | Status: SHIPPED | OUTPATIENT
Start: 2025-02-25

## 2025-02-25 NOTE — PROGRESS NOTES
Subjective     Andre Witt is a 64 y.o. male who presents today for Follow-up (Abn testing).    CHIEF COMPLIANT  Chief Complaint   Patient presents with    Follow-up     Abn testing       Active Problems:  CAD: STEMI 10/2021: 100% proximal OM1, stented.  RCA  with good collateral filling, residual mid 50% LAD  HTN  HLD  Echo 2/2022: EF improved to 65% ( was 35% on [facility] after STEMI)  4.1 echocardiogram 12/13/2024: EF 45 to 50%, mild concentric LVH with grade 1 diastolic dysfunction.  Trivial MR.  MUGA scan 2/17/2025: EF 40%    HPI  The patient is a 64-year-old male that returns for follow-up to discuss recent testing.  The patient has had increasing dyspnea and fatigue.  He was scheduled for a repeat echocardiogram in December which did show slightly reduced ejection fraction of 45 to 50%.  He does have CAD with STEMI in 2021.  At that time his EF was 35% but had improved to 65% on repeat echo in February 2022.  A MUGA scan was completed on 2/17/2025 to reevaluate EF and did show that his EF is low at 40%.  The patient denies chest pain.  He has noted over the past year that he has had increased fatigue, decreased energy and increasing exertional dyspnea.  Last week he was started on Jardiance.  Losartan was stopped and we attempted to change him to Entresto but this was expensive with his insurance and he has not been able to start this medication yet.  He has noted a slight improvement with dyspnea since starting Jardiance.  He denies syncope, near syncope or palpitations.      PRIOR MEDS  Current Outpatient Medications on File Prior to Visit   Medication Sig Dispense Refill    albuterol sulfate  (90 Base) MCG/ACT inhaler Inhale 2 puffs Every 4 (Four) Hours As Needed for Wheezing.      Cholecalciferol 25 MCG (1000 UT) tablet Take 1 tablet by mouth Daily.      clopidogrel (PLAVIX) 75 MG tablet Take 1 tablet by mouth Daily. 90 tablet 3    diclofenac (VOLTAREN) 75 MG EC tablet       empagliflozin  (JARDIANCE) 10 MG tablet tablet Take 1 tablet by mouth Daily. 30 tablet 3    fluticasone (FLONASE) 50 MCG/ACT nasal spray Administer 2 sprays into the nostril(s) as directed by provider Daily.      glucosamine-chondroitin 500-400 MG capsule capsule Take  by mouth 3 (Three) Times a Day With Meals.      loratadine (Claritin) 10 MG tablet Take 1 tablet by mouth Daily.      metoprolol succinate XL (TOPROL-XL) 50 MG 24 hr tablet Take 1 tablet by mouth Daily. 90 tablet 3    multivitamin with minerals tablet tablet Take 1 tablet by mouth Daily.      omeprazole (priLOSEC) 20 MG capsule       PARoxetine (PAXIL) 30 MG tablet       rosuvastatin (CRESTOR) 40 MG tablet Take 1 tablet by mouth Daily. 90 tablet 3    [DISCONTINUED] sacubitril-valsartan (Entresto) 24-26 MG tablet Take 1 tablet by mouth 2 (Two) Times a Day. 60 tablet 3     No current facility-administered medications on file prior to visit.       ALLERGIES  Celebrex [celecoxib] and Codeine    HISTORY  Past Medical History:   Diagnosis Date    Asthma     Heart attack     x2    History of hernia repair     History of total hip replacement     bilateral hips    Hx of blood clots     upper RT thigh toward groin area    Hypertension        Social History     Socioeconomic History    Marital status:    Tobacco Use    Smoking status: Never    Smokeless tobacco: Never   Substance and Sexual Activity    Alcohol use: Never    Drug use: Never    Sexual activity: Defer       Family History   Problem Relation Age of Onset    Stroke Mother         multiple strokes    Diabetes Mother     Diabetes Father     Heart failure Father     Heart disease Father     Heart attack Father         open heart surgery    Prostate cancer Father     Hyperlipidemia Sister     Diabetes Sister     No Known Problems Brother     No Known Problems Maternal Grandmother     No Known Problems Maternal Grandfather     No Known Problems Paternal Grandmother     No Known Problems Paternal Grandfather   "      Review of Systems   Constitutional:  Positive for fatigue.   Respiratory:  Positive for shortness of breath (daily activity). Negative for chest tightness.    Cardiovascular:  Positive for chest pain (mild), palpitations (once in a while) and leg swelling (blood clot right leg).   Neurological:  Positive for dizziness (postition change), weakness (generalized) and numbness (left foot). Negative for syncope and headaches.   Hematological:  Bruises/bleeds easily.   Psychiatric/Behavioral:  Positive for sleep disturbance (doing better).        Objective     VITALS: /68   Pulse 64   Ht 170.2 cm (67\")   Wt 89.7 kg (197 lb 12.8 oz)   SpO2 96%   BMI 30.98 kg/m²     LABS:   Lab Results (most recent)       None            IMAGING:   No Images in the past 120 days found..    EXAM:  Physical Exam  Constitutional:       Appearance: Normal appearance.   Eyes:      Pupils: Pupils are equal, round, and reactive to light.   Cardiovascular:      Rate and Rhythm: Normal rate and regular rhythm.      Pulses:           Carotid pulses are 2+ on the right side and 2+ on the left side.       Radial pulses are 2+ on the right side and 2+ on the left side.        Dorsalis pedis pulses are 2+ on the right side and 2+ on the left side.        Posterior tibial pulses are 2+ on the right side and 2+ on the left side.      Heart sounds: Normal heart sounds.   Pulmonary:      Effort: Pulmonary effort is normal.      Breath sounds: Normal breath sounds.   Abdominal:      General: Bowel sounds are normal.      Palpations: Abdomen is soft.   Musculoskeletal:      Right lower leg: No edema.      Left lower leg: No edema.   Skin:     General: Skin is warm and dry.      Capillary Refill: Capillary refill takes less than 2 seconds.   Neurological:      General: No focal deficit present.      Mental Status: He is alert and oriented to person, place, and time.   Psychiatric:         Mood and Affect: Mood normal.         Thought Content: " Thought content normal.         Procedure   Procedures       Assessment & Plan    Diagnosis Plan   1. Cardiomyopathy, unspecified type  [facility] Cath      2. Dyspnea on exertion        3. Acute HFrEF (heart failure with reduced ejection fraction)        4. Coronary artery disease involving native coronary artery of native heart without angina pectoris  [facility] Cath      5. Essential hypertension        6. Hyperlipidemia, unspecified hyperlipidemia type        7. Other fatigue          Plan:  Cardiomyopathy: EF 40% per recent MUGA.  The patient has had increasing exertional dyspnea, decreased energy and increased fatigue over the past several months.  Due to declining LV systolic function we will schedule the patient for left cardiac catheterization to further define coronary anatomy.  Will proceed with left cardiac catheterization.  Risks, benefits and alternatives discussed with the patient.  Risks including but not limited to reaction to contrast dye, acute kidney injury, bleeding at cath site, damage to arteries, heart or area where catheter inserted, infection, heart attack, stroke, blood clots,  arrhythmia.  Patient verbalizes understanding and wishes to proceed.  Will plan to see the patient back after procedure for ongoing evaluation.  Continue Toprol and Jardiance.  We were able to provide the patient with Entresto samples today and give him a coupon for a free 30-day supply.    Dyspnea on exertion: Jardiance was added last week.  Will proceed with left cardiac catheterization as above.  HFrEF: Plan as above.  CAD: Continue current medical management including aspirin, Plavix, Toprol,  rosuvastatin. Changing Losartan to Entresto.  Proceed with C as discussed above.  He does have sublingual nitroglycerin to use if needed.  We did review instructions on appropriate use and when to seek emergency treatment.  Essential hypertension: BP has been stable.  We are stopping losartan and changing to  Entresto due to cardiomyopathy.  I have asked him to monitor BP closely when making this change. The patient was instructed to monitor BP at home and to notify our office if systolic BP is consistently greater than 130-135 systolic.  Goal BP is 130-135/70-80.  6.   Hyperlipidemia: Continue statin.  Recent lipid panel shows good control.  Will            periodically review lipid panel.    Return for 1-2 weeks after heart cath .    Andre Witt  reports that he has never smoked. He has never used smokeless tobacco.     Advance Care Planning   ACP discussion was declined by the patient. Patient does not have an advance directive, declines further assistance.                  MEDS ORDERED DURING VISIT:  New Medications Ordered This Visit   Medications    sacubitril-valsartan (Entresto) 24-26 MG tablet     Sig: Take 1 tablet by mouth 2 (Two) Times a Day.     Dispense:  28 tablet     Refill:  0     D/c losartan     Order Specific Question:   Lot Number?     Answer:   [id number]     Order Specific Question:   Expiration Date?     Answer:   12/30/2026       DISCONTINUED MEDS DURING VISIT:   Medications Discontinued During This Encounter   Medication Reason    sacubitril-valsartan (Entresto) 24-26 MG tablet Reorder          This document has been electronically signed by PEPPER Vidales  February 25, 2025 16:51 EST    Dictated Utilizing Dragon Dictation: Part of this note may be an electronic transcription/translation of spoken language to printed text using the Dragon Dictation System

## 2025-02-26 ENCOUNTER — TELEPHONE (OUTPATIENT)
Dept: CARDIOLOGY | Facility: CLINIC | Age: 65
End: 2025-02-26
Payer: MEDICARE

## 2025-02-26 NOTE — TELEPHONE ENCOUNTER
Patient states Entresto is too expensive, he is going to call and see if he can qualify for assistance through the 1-800 SS number. Patient currently has samples.

## 2025-02-27 ENCOUNTER — TELEPHONE (OUTPATIENT)
Dept: CARDIOLOGY | Facility: CLINIC | Age: 65
End: 2025-02-27
Payer: MEDICARE

## 2025-02-27 NOTE — TELEPHONE ENCOUNTER
Per [facility]: Pt should have the 30-day free trial for Entresto and a 2- week supply of samples. The medicine goes generic this summer; worst comes to worst, we can go back to Losartan until the medicine goes generic. For now, pt needs to use the 30-day trial and his samples. When he gets down to 5 or so pills, he needs to call the office and let us know he needs samples. It needs to be reiterated that I[facility] sees and treats patients, but scheduling, samples, etc is handled by other staff, so contacting the office is the best way to get things addressed to minimize delays to pt care for him and others.

## 2025-02-27 NOTE — TELEPHONE ENCOUNTER
Called pt, he stated the pharmacy would not allow him to use the $10 or 30 day co=pay cards. Pt states he has only taken 2 or so pills from the samples we gave him. I explained that I will try to keep him supplied and that worst comes to worst, we will do Losartan until Entresto goes generic.   I explained to pt to call regd medicines and his needs, as I am the one that handles those things not josé. Explained she doesn't have the code to the closet, etc, so speaking w/ me or leaving me messages will be better in the long run.   Pt stated he's supposed to be receiving paperwork regd SS assistance through the mail and was told it would take 10 days after completion to hear back.

## 2025-03-06 RX ORDER — SACUBITRIL AND VALSARTAN 24; 26 MG/1; MG/1
1 TABLET, FILM COATED ORAL 2 TIMES DAILY
Qty: 28 TABLET | Refills: 0 | COMMUNITY
Start: 2025-03-06

## 2025-03-06 NOTE — TELEPHONE ENCOUNTER
Set aside Entresto samples for pt.     Called and informed pt that samples were set aside for him.

## 2025-03-20 DIAGNOSIS — I25.10 CORONARY ARTERY DISEASE INVOLVING NATIVE CORONARY ARTERY OF NATIVE HEART WITHOUT ANGINA PECTORIS: ICD-10-CM

## 2025-03-20 DIAGNOSIS — I42.9 CARDIOMYOPATHY, UNSPECIFIED TYPE: ICD-10-CM

## 2025-03-24 ENCOUNTER — TELEPHONE (OUTPATIENT)
Dept: CARDIOLOGY | Facility: CLINIC | Age: 65
End: 2025-03-24
Payer: MEDICARE

## 2025-03-24 NOTE — TELEPHONE ENCOUNTER
Name: Andre Witt      Relationship: Self      Best Callback Number: [phone]      HUB PROVIDED THE RELAY MESSAGE FROM THE OFFICE      PATIENT: VOICED UNDERSTANDING AND HAS NO FURTHER QUESTIONS AT THIS TIME    ADDITIONAL INFORMATION: PATIENT STATES HE MAILED THE OTHER PAGES TO THE ADDRESS THAT WAS ON THE FORM. PLEASE CALL HIM BACK TO DISCUSS WHEN POSSIBLE. PATIENT WILL BE NEEDING MORE SAMPLES SOON AS WELL AND HE WOULD LIKE TO KNOW IF THE OFFICE HAS ANY THAT HE CAN  TOMORROW IF POSSIBLE. THANK YOU

## 2025-03-24 NOTE — TELEPHONE ENCOUNTER
Set aside samples for pt.   We can fax this form or return it to the pt. Normally, we try to make a copy of all the forms so we have proof of what was sent if they claim something is missing.         Called pt and informed him of the above, he stated to fax the form we have and he will P/U samples tomorrow.       Faxed form and placed in scan bin.

## 2025-03-24 NOTE — TELEPHONE ENCOUNTER
Received [employer] patient assistance form from patient, but only one of the forms. Per chart review, the other forms aren't scanned in. Nothing in the box up front to be scanned either.       First attempt to reach pt. Left a voicemail for pt to return my call at [phone].       RELAY      Please ask pt if he has the rest of his patient assistance forms, as I just have the sheet he dropped off.

## 2025-03-31 ENCOUNTER — OFFICE VISIT (OUTPATIENT)
Dept: CARDIOLOGY | Facility: CLINIC | Age: 65
End: 2025-03-31
Payer: MEDICARE

## 2025-03-31 VITALS
OXYGEN SATURATION: 96 % | HEIGHT: 67 IN | WEIGHT: 198.4 LBS | HEART RATE: 68 BPM | BODY MASS INDEX: 31.14 KG/M2 | DIASTOLIC BLOOD PRESSURE: 72 MMHG | SYSTOLIC BLOOD PRESSURE: 108 MMHG

## 2025-03-31 DIAGNOSIS — R06.09 DYSPNEA ON EXERTION: ICD-10-CM

## 2025-03-31 DIAGNOSIS — I50.23 ACUTE ON CHRONIC HFREF (HEART FAILURE WITH REDUCED EJECTION FRACTION): ICD-10-CM

## 2025-03-31 DIAGNOSIS — I25.5 ISCHEMIC CARDIOMYOPATHY: Primary | ICD-10-CM

## 2025-03-31 DIAGNOSIS — I10 ESSENTIAL HYPERTENSION: ICD-10-CM

## 2025-03-31 DIAGNOSIS — E78.5 HYPERLIPIDEMIA, UNSPECIFIED HYPERLIPIDEMIA TYPE: ICD-10-CM

## 2025-03-31 DIAGNOSIS — I25.10 CORONARY ARTERY DISEASE INVOLVING NATIVE CORONARY ARTERY OF NATIVE HEART WITHOUT ANGINA PECTORIS: ICD-10-CM

## 2025-03-31 PROCEDURE — 3074F SYST BP LT 130 MM HG: CPT | Performed by: CLINICAL NURSE SPECIALIST

## 2025-03-31 PROCEDURE — 3078F DIAST BP <80 MM HG: CPT | Performed by: CLINICAL NURSE SPECIALIST

## 2025-03-31 PROCEDURE — 99214 OFFICE O/P EST MOD 30 MIN: CPT | Performed by: CLINICAL NURSE SPECIALIST

## 2025-03-31 RX ORDER — NITROGLYCERIN 0.4 MG/1
TABLET SUBLINGUAL
Qty: 30 TABLET | Refills: 5 | Status: SHIPPED | OUTPATIENT
Start: 2025-03-31

## 2025-03-31 NOTE — PROGRESS NOTES
Subjective     Andre Witt is a 65 y.o. male who presents today for Follow-up (Cath F/U ).    CHIEF COMPLIANT  Chief Complaint   Patient presents with    Follow-up     Cath F/U        Active Problems:  CAD: STEMI 10/2021: 100% proximal OM1, stented.  RCA  with good collateral filling, residual mid 50% LAD  1.1 left cardiac catheterization 3/19/25: Left main with ostial/proximal 30%, LAD with sluggish flow noted in the LAD circulation.  LAD 20 to 30% proximal and 30 to 40% mid stenosis.  D1 and D2 are small vessels.  D3 is a small to medium size vessel with ostial 80% stenosis.  Left to right collaterals are seen filling the right PDA and PLV.  LCx has mild luminal irregularities.  OM1 is small vessel.  OM 2 with patent proximal stent.  % chronically occluded.  HTN  HLD  Echo 2/2022: EF improved to 65% ( was 35% on LHC after STEMI)  4.1 echocardiogram 12/13/2024: EF 45 to 50%, mild concentric LVH with grade 1 diastolic dysfunction.  Trivial MR.  MUGA scan 2/17/2025: EF 40%    HPI  The patient is a 65 year old male that returns for follow up after recent LHC.  He was found to have an EF of 40% on recent MUGA.  He had a LHC on 3/19/25 which showed left main with ostial/proximal 30%, LAD with sluggish flow noted in the LAD circulation.  LAD 20 to 30% proximal and 30 to 40% mid stenosis.  D1 and D2 are small vessels.  D3 is a small to medium size vessel with ostial 80% stenosis.  Left to right collaterals are seen filling the right PDA and PLV.  LCx has mild luminal irregularities.  OM1 is small vessel.  OM 2 with patent proximal stent.  % chronically occluded.  Medications were adjusted for better management of cardiomyopathy.  He was started on Jardiance.  Losartan was changed to Entresto.  He states since starting these medications dyspnea has improved.  He denies chest pain, syncope, near syncope.  He does remain a little fatigued but overall feels this has improved with medication as well.  Radial  cath site is well-healed.  Radial pulse 2+.        PRIOR MEDS  Current Outpatient Medications on File Prior to Visit   Medication Sig Dispense Refill    albuterol sulfate  (90 Base) MCG/ACT inhaler Inhale 2 puffs Every 4 (Four) Hours As Needed for Wheezing.      Cholecalciferol 25 MCG (1000 UT) tablet Take 1 tablet by mouth Daily.      clopidogrel (PLAVIX) 75 MG tablet Take 1 tablet by mouth Daily. 90 tablet 3    diclofenac (VOLTAREN) 75 MG EC tablet       empagliflozin (JARDIANCE) 10 MG tablet tablet Take 1 tablet by mouth Daily. 30 tablet 3    fluticasone (FLONASE) 50 MCG/ACT nasal spray Administer 2 sprays into the nostril(s) as directed by provider Daily.      glucosamine-chondroitin 500-400 MG capsule capsule Take  by mouth 3 (Three) Times a Day With Meals.      loratadine (Claritin) 10 MG tablet Take 1 tablet by mouth Daily.      metoprolol succinate XL (TOPROL-XL) 50 MG 24 hr tablet Take 1 tablet by mouth Daily. 90 tablet 3    multivitamin with minerals tablet tablet Take 1 tablet by mouth Daily.      omeprazole (priLOSEC) 20 MG capsule       PARoxetine (PAXIL) 30 MG tablet       rosuvastatin (CRESTOR) 40 MG tablet Take 1 tablet by mouth Daily. 90 tablet 3    sacubitril-valsartan (Entresto) 24-26 MG tablet Take 1 tablet by mouth 2 (Two) Times a Day. 28 tablet 0    [DISCONTINUED] nitroglycerin (NITROSTAT) 0.4 MG SL tablet 1 under the tongue as needed for angina, may repeat q5mins for up three doses 30 tablet 5     No current facility-administered medications on file prior to visit.       ALLERGIES  Celebrex [celecoxib] and Codeine    HISTORY  Past Medical History:   Diagnosis Date    Asthma     Heart attack     x2    History of hernia repair     History of total hip replacement     bilateral hips    Hx of blood clots     upper RT thigh toward groin area    Hypertension        Social History     Socioeconomic History    Marital status:    Tobacco Use    Smoking status: Never    Smokeless tobacco:  "Never   Substance and Sexual Activity    Alcohol use: Never    Drug use: Never    Sexual activity: Defer       Family History   Problem Relation Age of Onset    Stroke Mother         multiple strokes    Diabetes Mother     Diabetes Father     Heart failure Father     Heart disease Father     Heart attack Father         open heart surgery    Prostate cancer Father     Hyperlipidemia Sister     Diabetes Sister     No Known Problems Brother     No Known Problems Maternal Grandmother     No Known Problems Maternal Grandfather     No Known Problems Paternal Grandmother     No Known Problems Paternal Grandfather        Review of Systems   Constitutional:  Positive for fatigue (Easily tired).   Eyes:  Positive for visual disturbance (Glasses daily).   Respiratory:  Positive for shortness of breath (Occasionally w/ increased activity). Negative for chest tightness.    Cardiovascular:  Negative for chest pain, palpitations and leg swelling.   Gastrointestinal:  Negative for blood in stool.   Genitourinary:  Negative for hematuria.   Neurological:  Positive for dizziness (Occasionally, if he stands too fast) and numbness (Unchanged- numbness in one foot since hip replacement). Negative for weakness and headaches.   Hematological:  Bruises/bleeds easily.   Psychiatric/Behavioral:  Negative for sleep disturbance.        Objective     VITALS: /72   Pulse 68   Ht 170.2 cm (67\")   Wt 90 kg (198 lb 6.4 oz)   SpO2 96%   BMI 31.07 kg/m²     LABS:   Lab Results (most recent)       None            IMAGING:   No Images in the past 120 days found..    EXAM:  Physical Exam  Constitutional:       Appearance: Normal appearance.   Eyes:      Pupils: Pupils are equal, round, and reactive to light.   Cardiovascular:      Rate and Rhythm: Normal rate and regular rhythm.      Pulses:           Carotid pulses are 2+ on the right side and 2+ on the left side.       Radial pulses are 2+ on the right side and 2+ on the left side.        " Dorsalis pedis pulses are 2+ on the right side and 2+ on the left side.        Posterior tibial pulses are 2+ on the right side and 2+ on the left side.      Heart sounds: Normal heart sounds.   Pulmonary:      Effort: Pulmonary effort is normal.      Breath sounds: Normal breath sounds.   Abdominal:      General: Bowel sounds are normal.      Palpations: Abdomen is soft.   Musculoskeletal:      Right lower leg: No edema.      Left lower leg: No edema.   Skin:     General: Skin is warm and dry.      Capillary Refill: Capillary refill takes less than 2 seconds.   Neurological:      General: No focal deficit present.      Mental Status: He is alert and oriented to person, place, and time.   Psychiatric:         Mood and Affect: Mood normal.         Thought Content: Thought content normal.         Procedure   Procedures       Assessment & Plan    Diagnosis Plan   1. Ischemic cardiomyopathy        2. Dyspnea on exertion  Adult Transthoracic Echo Limited W/ Cont if Necessary Per Protocol      3. Acute on chronic HFrEF (heart failure with reduced ejection fraction)  Adult Transthoracic Echo Limited W/ Cont if Necessary Per Protocol      4. Coronary artery disease involving native coronary artery of native heart without angina pectoris        5. Essential hypertension        6. Hyperlipidemia, unspecified hyperlipidemia type          Plan:  Ischemic cardiomyopathy: EF 40% per recent MUGA.  Continue medical management including Entresto, Toprol and Jardiance.  He does feel like dyspnea has improved since starting Entresto and adding Jardiance.  Will plan to repeat a limited echocardiogram in a couple months to reevaluate LV function.   Dyspnea on exertion: Symptoms have improved since changing to Entresto and adding Jardiance.  He was advised notify our office if symptoms return or worsen.   HFrEF: Plan as above.  CAD: Continue current medical management including aspirin, Plavix, Toprol,  rosuvastatin.  Continue Entresto.   He denies current chest pain symptoms.  He did request a refill on sublingual nitroglycerin.  We did review instructions on appropriate use and when to seek emergency treatment.    Essential hypertension: BP has been stable.  I asked him to continue to monitor his BP closely. The patient was instructed to monitor BP at home and to notify our office if systolic BP is consistently greater than 130-135 systolic.  Goal BP is 130-135/70-80.  6.   Hyperlipidemia: Continue statin.  Recent lipid panel shows good control.  Will            periodically review lipid panel.    Return in about 11 weeks (around 2025).    Andre Witt  reports that he has never smoked. He has never used smokeless tobacco.      Advance Care Planning  ACP discussion was declined by the patient. Patient does not have an advance directive, declines further assistance.         MEDS ORDERED DURING VISIT:  New Medications Ordered This Visit   Medications    nitroglycerin (NITROSTAT) 0.4 MG SL tablet     Si under the tongue as needed for angina, may repeat q5mins for up three doses     Dispense:  30 tablet     Refill:  5       DISCONTINUED MEDS DURING VISIT:   Medications Discontinued During This Encounter   Medication Reason    nitroglycerin (NITROSTAT) 0.4 MG SL tablet Reorder          This document has been electronically signed by PEPPER Vidales  2025 10:59 EDT    Dictated Utilizing Dragon Dictation: Part of this note may be an electronic transcription/translation of spoken language to printed text using the Dragon Dictation System

## 2025-04-01 ENCOUNTER — TELEPHONE (OUTPATIENT)
Dept: CARDIOLOGY | Facility: CLINIC | Age: 65
End: 2025-04-01
Payer: MEDICARE

## 2025-04-01 NOTE — TELEPHONE ENCOUNTER
Caller: Andre Witt    Relationship: Self    Best call back number: [phone] (home)     What is the best time to reach you: ANYTIME    Who are you requesting to speak with (clinical staff, provider,  specific staff member): CLINICAL    What was the call regarding: PT SAYS [employer] DOES NOT HAVE HIS PAPERWORK THAT WAS SUBMITTED PREVIOUSLY. HE SPOKE WITH THEM 4.1.25. PLEASE CALL PT WHEN AVAILABLE.

## 2025-04-01 NOTE — TELEPHONE ENCOUNTER
Pt mailed his paperwork to the company, we discussed this on 3/24/25 and I explained that meant we don't have a copy in his chart to send if there's an issue.   We have a copy of the prescriber page, as it's the only page pt gave to us.       Called pt, he asked that I re-send the provider portion of the paperwork.       Printed it from media tab and re-faxed for pt.

## 2025-04-04 NOTE — TELEPHONE ENCOUNTER
Received fax from assistance company stating they never received pt's denial letter from Medicare, that is needed to proceed w/ processing assistance application.     Pt was told on 2/26/25 to call SS office regd the above. He can call [phone] and request letter.           Called pt, he stated he never contacted the SS office regd the above. I gave him the number to call and explained that we can't move forward w/o a denial letter. Stated if he's approved w/ SS office, he wont need pt assistance. If denied, obtain a letter to send in. Pt verbalized understanding.

## 2025-05-02 ENCOUNTER — TELEPHONE (OUTPATIENT)
Dept: CARDIOLOGY | Facility: CLINIC | Age: 65
End: 2025-05-02
Payer: MEDICARE

## 2025-05-08 RX ORDER — SACUBITRIL AND VALSARTAN 24; 26 MG/1; MG/1
1 TABLET, FILM COATED ORAL 2 TIMES DAILY
Qty: 28 TABLET | Refills: 0 | COMMUNITY
Start: 2025-05-08

## 2025-05-19 ENCOUNTER — HOSPITAL ENCOUNTER (OUTPATIENT)
Dept: CARDIOLOGY | Facility: HOSPITAL | Age: 65
Discharge: HOME | End: 2025-05-19
Admitting: CLINICAL NURSE SPECIALIST
Payer: MEDICARE

## 2025-05-19 VITALS — WEIGHT: 198.41 LBS | BODY MASS INDEX: 31.14 KG/M2 | HEIGHT: 67 IN

## 2025-05-19 DIAGNOSIS — I50.23 ACUTE ON CHRONIC HFREF (HEART FAILURE WITH REDUCED EJECTION FRACTION): ICD-10-CM

## 2025-05-19 DIAGNOSIS — R06.09 DYSPNEA ON EXERTION: ICD-10-CM

## 2025-05-19 LAB
AORTIC DIMENSIONLESS INDEX: 0.92 (DI)
AV MEAN PRESS GRAD SYS DOP V1V2: 2.17 MMHG
AV VMAX SYS DOP: 96.6 CM/SEC
BH CV ECHO MEAS - AO MAX PG: 3.7 MMHG
BH CV ECHO MEAS - AO ROOT DIAM: 3.7 CM
BH CV ECHO MEAS - AO V2 VTI: 23.4 CM
BH CV ECHO MEAS - EDV(CUBED): 94.9 ML
BH CV ECHO MEAS - ESV(CUBED): 31.1 ML
BH CV ECHO MEAS - FS: 31 %
BH CV ECHO MEAS - IVS/LVPW: 1.07 CM
BH CV ECHO MEAS - IVSD: 1.09 CM
BH CV ECHO MEAS - LA DIMENSION: 3.6 CM
BH CV ECHO MEAS - LAT PEAK E' VEL: 9.8 CM/SEC
BH CV ECHO MEAS - LV MASS(C)D: 168.8 GRAMS
BH CV ECHO MEAS - LV MAX PG: 3 MMHG
BH CV ECHO MEAS - LV MEAN PG: 1.37 MMHG
BH CV ECHO MEAS - LV V1 MAX: 86 CM/SEC
BH CV ECHO MEAS - LV V1 VTI: 21.6 CM
BH CV ECHO MEAS - LVIDD: 4.6 CM
BH CV ECHO MEAS - LVIDS: 3.1 CM
BH CV ECHO MEAS - LVPWD: 1.02 CM
BH CV ECHO MEAS - MED PEAK E' VEL: 8.6 CM/SEC
BH CV ECHO MEAS - MV A MAX VEL: 50.3 CM/SEC
BH CV ECHO MEAS - MV DEC SLOPE: 342.8 CM/SEC2
BH CV ECHO MEAS - MV DEC TIME: 0.29 SEC
BH CV ECHO MEAS - MV E MAX VEL: 43.9 CM/SEC
BH CV ECHO MEAS - MV E/A: 0.87
BH CV ECHO MEAS - MV MAX PG: 2.45 MMHG
BH CV ECHO MEAS - MV MEAN PG: 0.89 MMHG
BH CV ECHO MEAS - MV P1/2T: 65.2 MSEC
BH CV ECHO MEAS - MV V2 VTI: 26.9 CM
BH CV ECHO MEAS - MVA(P1/2T): 3.4 CM2
BH CV ECHO MEAS - PA V2 MAX: 98.5 CM/SEC
BH CV ECHO MEAS - RAP SYSTOLE: 3 MMHG
BH CV ECHO MEAS - RV MAX PG: 1.87 MMHG
BH CV ECHO MEAS - RV V1 MAX: 68.3 CM/SEC
BH CV ECHO MEAS - RV V1 VTI: 16 CM
BH CV ECHO MEAS - RVSP: 25.8 MMHG
BH CV ECHO MEAS - TAPSE (>1.6): 3 CM
BH CV ECHO MEAS - TR MAX PG: 22.8 MMHG
BH CV ECHO MEAS - TR MAX VEL: 238.6 CM/SEC
BH CV ECHO MEASUREMENTS AVERAGE E/E' RATIO: 4.77
BH CV XLRA - RV BASE: 3.8 CM
BH CV XLRA - RV LENGTH: 7.9 CM
BH CV XLRA - RV MID: 2.7 CM
BH CV XLRA - TDI S': 10.9 CM/SEC
LV EF 3D SEGMENTATION: 52 %
SINUS: 3.6 CM

## 2025-05-19 PROCEDURE — 93308 TTE F-UP OR LMTD: CPT

## 2025-05-19 PROCEDURE — 93325 DOPPLER ECHO COLOR FLOW MAPG: CPT

## 2025-05-19 PROCEDURE — 93321 DOPPLER ECHO F-UP/LMTD STD: CPT

## 2025-05-19 NOTE — TELEPHONE ENCOUNTER
Caller: [email or web address] - [place], [place] - [address] - [phone] [facility] [phone] FX    Relationship: Pharmacy    Best call back number: [phone]    Requested Prescriptions:   Requested Prescriptions     Pending Prescriptions Disp Refills    empagliflozin (JARDIANCE) 10 MG tablet tablet 30 tablet 3     Sig: Take 1 tablet by mouth Daily.        Pharmacy where request should be sent: [facility] - [place], [place] - [address] [facility] - [phone] [facility] [phone] FX     Last office visit with prescribing clinician: 3/31/2025   Last telemedicine visit with prescribing clinician: Visit date not found   Next office visit with prescribing clinician: 6/16/2025     Additional details provided by patient:     Does the patient have less than a 3 day supply:  [x] Yes  [] No    Would you like a call back once the refill request has been completed: [x] Yes [] No    If the office needs to give you a call back, can they leave a voicemail: [x] Yes [] No    Lizzie Weaver Rep   05/19/25 11:42 EDT

## 2025-05-27 ENCOUNTER — TELEPHONE (OUTPATIENT)
Dept: CARDIOLOGY | Facility: HOSPITAL | Age: 65
End: 2025-05-27
Payer: MEDICARE

## 2025-05-27 RX ORDER — SACUBITRIL AND VALSARTAN 24; 26 MG/1; MG/1
1 TABLET, FILM COATED ORAL 2 TIMES DAILY
Qty: 28 TABLET | Refills: 0 | COMMUNITY
Start: 2025-05-27

## 2025-05-27 NOTE — TELEPHONE ENCOUNTER
Pt came into the office requesting Entresto 24-26 mg samples.  He stated he is out.  Danika is inputting order and samples pulled to give to the pt once it falls to collect specimen.

## 2025-05-30 ENCOUNTER — RESULTS FOLLOW-UP (OUTPATIENT)
Dept: CARDIOLOGY | Facility: CLINIC | Age: 65
End: 2025-05-30
Payer: MEDICARE

## 2025-05-30 NOTE — TELEPHONE ENCOUNTER
PATIENT AWARE OF RECOMMENDATIONS.    View older events    Iman Ragland APRN to Danika Sykes      5/30/25  7:58 AM  Note      EF has improved, back to 50-55%.  Continue current medications.           Adult Transthoracic Echo Limited W/ Cont if Necessary Per Protocol

## 2025-06-09 ENCOUNTER — TELEPHONE (OUTPATIENT)
Dept: CARDIOLOGY | Facility: CLINIC | Age: 65
End: 2025-06-09
Payer: MEDICARE

## 2025-06-09 RX ORDER — SACUBITRIL AND VALSARTAN 24; 26 MG/1; MG/1
1 TABLET, FILM COATED ORAL 2 TIMES DAILY
Qty: 28 TABLET | Refills: 0 | COMMUNITY
Start: 2025-06-09

## 2025-06-09 NOTE — TELEPHONE ENCOUNTER
----- Message from Hallie CARTER sent at 6/9/2025 11:28 AM EDT -----  Patient came in for his appointment was a week early,he proceeded to tell me that he is almost out of Entresto. I went ahead and pulled it -1 bottle. I will document in the book if you can put an order in please.      Lot#:[id number]  EX:1/3/1929

## 2025-06-16 ENCOUNTER — OFFICE VISIT (OUTPATIENT)
Dept: CARDIOLOGY | Facility: CLINIC | Age: 65
End: 2025-06-16
Payer: MEDICARE

## 2025-06-16 VITALS
SYSTOLIC BLOOD PRESSURE: 102 MMHG | BODY MASS INDEX: 30.61 KG/M2 | WEIGHT: 195 LBS | HEIGHT: 67 IN | OXYGEN SATURATION: 96 % | HEART RATE: 57 BPM | DIASTOLIC BLOOD PRESSURE: 69 MMHG

## 2025-06-16 DIAGNOSIS — I25.10 CORONARY ARTERY DISEASE INVOLVING NATIVE CORONARY ARTERY OF NATIVE HEART WITHOUT ANGINA PECTORIS: ICD-10-CM

## 2025-06-16 DIAGNOSIS — R06.09 DYSPNEA ON EXERTION: ICD-10-CM

## 2025-06-16 DIAGNOSIS — I25.5 ISCHEMIC CARDIOMYOPATHY: Primary | ICD-10-CM

## 2025-06-16 DIAGNOSIS — I10 ESSENTIAL HYPERTENSION: ICD-10-CM

## 2025-06-16 PROCEDURE — 3078F DIAST BP <80 MM HG: CPT | Performed by: CLINICAL NURSE SPECIALIST

## 2025-06-16 PROCEDURE — 99214 OFFICE O/P EST MOD 30 MIN: CPT | Performed by: CLINICAL NURSE SPECIALIST

## 2025-06-16 PROCEDURE — 3074F SYST BP LT 130 MM HG: CPT | Performed by: CLINICAL NURSE SPECIALIST

## 2025-06-16 RX ORDER — METOPROLOL SUCCINATE 25 MG/1
25 TABLET, EXTENDED RELEASE ORAL DAILY
Qty: 90 TABLET | Refills: 3 | Status: SHIPPED | OUTPATIENT
Start: 2025-06-16

## 2025-06-16 RX ORDER — METOPROLOL SUCCINATE 25 MG/1
25 TABLET, EXTENDED RELEASE ORAL DAILY
Qty: 90 TABLET | Refills: 3 | Status: SHIPPED | OUTPATIENT
Start: 2025-06-16 | End: 2025-06-16

## 2025-06-16 NOTE — PROGRESS NOTES
Subjective     Andre Witt is a 65 y.o. male who presents today for Follow-up (Echo).    CHIEF COMPLIANT  Chief Complaint   Patient presents with    Follow-up     Echo       Active Problems:  CAD: STEMI 10/2021: 100% proximal OM1, stented.  RCA  with good collateral filling, residual mid 50% LAD  1.1 left cardiac catheterization 3/19/25: Left main with ostial/proximal 30%, LAD with sluggish flow noted in the LAD circulation.  LAD 20 to 30% proximal and 30 to 40% mid stenosis.  D1 and D2 are small vessels.  D3 is a small to medium size vessel with ostial 80% stenosis.  Left to right collaterals are seen filling the right PDA and PLV.  LCx has mild luminal irregularities.  OM1 is small vessel.  OM 2 with patent proximal stent.  % chronically occluded.  HTN  HLD  Echo 2/2022: EF improved to 65% ( was 35% on [facility] after STEMI)  4.1 echocardiogram 12/13/2024: EF 45 to 50%, mild concentric LVH with grade 1 diastolic dysfunction.  Trivial MR.  4.2 Echo 5/19/25: EF 50-55%, Grade 1 DD Trivial MR, Trivial TR  MUGA scan 2/17/2025: EF 40%    HPI  The patient is a 65 year old male that returns for follow up to discuss most recent echo.  EF has improved to 50-55%.  He denies chest pain or worsening dyspnea.  He has noted a few episodes of orthostatic dizziness over the past few weeks.  BP is running on the low side in the office.   He denies syncope or near syncope.      PRIOR MEDS  Current Outpatient Medications on File Prior to Visit   Medication Sig Dispense Refill    albuterol sulfate  (90 Base) MCG/ACT inhaler Inhale 2 puffs Every 4 (Four) Hours As Needed for Wheezing.      Cholecalciferol 25 MCG (1000 UT) tablet Take 1 tablet by mouth Daily.      clopidogrel (PLAVIX) 75 MG tablet Take 1 tablet by mouth Daily. 90 tablet 3    diclofenac (VOLTAREN) 75 MG EC tablet       fluticasone (FLONASE) 50 MCG/ACT nasal spray Administer 2 sprays into the nostril(s) as directed by provider Daily.       glucosamine-chondroitin 500-400 MG capsule capsule Take  by mouth 3 (Three) Times a Day With Meals.      loratadine (Claritin) 10 MG tablet Take 1 tablet by mouth Daily.      multivitamin with minerals tablet tablet Take 1 tablet by mouth Daily.      nitroglycerin (NITROSTAT) 0.4 MG SL tablet 1 under the tongue as needed for angina, may repeat q5mins for up three doses 30 tablet 5    omeprazole (priLOSEC) 20 MG capsule       PARoxetine (PAXIL) 30 MG tablet       rosuvastatin (CRESTOR) 40 MG tablet Take 1 tablet by mouth Daily. 90 tablet 3    sacubitril-valsartan (Entresto) 24-26 MG tablet Take 1 tablet by mouth 2 (Two) Times a Day. 28 tablet 0    VITAMIN D PO Take  by mouth.      [DISCONTINUED] empagliflozin (JARDIANCE) 10 MG tablet tablet Take 1 tablet by mouth Daily. 30 tablet 3    [DISCONTINUED] metoprolol succinate XL (TOPROL-XL) 50 MG 24 hr tablet Take 1 tablet by mouth Daily. 90 tablet 3     No current facility-administered medications on file prior to visit.       ALLERGIES  Celebrex [celecoxib] and Codeine    HISTORY  Past Medical History:   Diagnosis Date    Asthma     Heart attack     x2    History of hernia repair     History of total hip replacement     bilateral hips    Hx of blood clots     upper RT thigh toward groin area    Hypertension        Social History     Socioeconomic History    Marital status:    Tobacco Use    Smoking status: Never    Smokeless tobacco: Never   Vaping Use    Vaping status: Never Used   Substance and Sexual Activity    Alcohol use: Never    Drug use: Never    Sexual activity: Defer       Family History   Problem Relation Age of Onset    Stroke Mother         multiple strokes    Diabetes Mother     Diabetes Father     Heart failure Father     Heart disease Father     Heart attack Father         open heart surgery    Prostate cancer Father     Hyperlipidemia Sister     Diabetes Sister     No Known Problems Brother     No Known Problems Maternal Grandmother     No Known  "Problems Maternal Grandfather     No Known Problems Paternal Grandmother     No Known Problems Paternal Grandfather        Review of Systems   Constitutional:  Positive for fatigue (Improved a little).   Eyes:  Positive for visual disturbance (Glasses daily).   Respiratory:  Positive for shortness of breath (Improved a little). Negative for chest tightness.    Cardiovascular:  Positive for palpitations (Had some flutters about 1 week ago, states it didn't last long) and leg swelling (RLE from clot years ago). Negative for chest pain.   Neurological:  Positive for dizziness (Sometimes w/ fast position change) and numbness (LLE since hip surgery). Negative for syncope and headaches.   Hematological:  Bruises/bleeds easily (Bruises).   Psychiatric/Behavioral:  Positive for sleep disturbance (Off and on sleep issues).        Objective     VITALS: /69   Pulse 57   Ht 170.2 cm (67\")   Wt 88.5 kg (195 lb)   SpO2 96%   BMI 30.54 kg/m²     LABS:   Lab Results (most recent)       None            IMAGING:   No Images in the past 120 days found..    EXAM:  Physical Exam  Constitutional:       Appearance: Normal appearance.   Eyes:      Pupils: Pupils are equal, round, and reactive to light.   Cardiovascular:      Rate and Rhythm: Normal rate and regular rhythm.      Pulses:           Carotid pulses are 2+ on the right side and 2+ on the left side.       Radial pulses are 2+ on the right side and 2+ on the left side.        Dorsalis pedis pulses are 2+ on the right side and 2+ on the left side.        Posterior tibial pulses are 2+ on the right side and 2+ on the left side.      Heart sounds: Normal heart sounds.   Pulmonary:      Effort: Pulmonary effort is normal.      Breath sounds: Normal breath sounds.   Abdominal:      General: Bowel sounds are normal.      Palpations: Abdomen is soft.   Musculoskeletal:      Right lower leg: No edema.      Left lower leg: No edema.   Skin:     General: Skin is warm and dry.     "  Capillary Refill: Capillary refill takes less than 2 seconds.   Neurological:      General: No focal deficit present.      Mental Status: He is alert and oriented to person, place, and time.   Psychiatric:         Mood and Affect: Mood normal.         Thought Content: Thought content normal.         Procedure   Procedures       Assessment & Plan    Diagnosis Plan   1. Ischemic cardiomyopathy        2. Dyspnea on exertion        3. Coronary artery disease involving native coronary artery of native heart without angina pectoris        4. Essential hypertension             Plan:  Ischemic cardiomyopathy: EF 40% per MUGA.  He was placed on appropriate guideline directed medical therapy.  Continue medical management including Entresto, Toprol and Jardiance. Most recent echo showed EF improved to 50-55%.    Dyspnea on exertion: Symptoms have improved since changing to Entresto and adding Jardiance.  He was advised notify our office if symptoms return or worsen.   HFrEF: Plan as above.  CAD: Continue current medical management including aspirin, Plavix, Toprol,  rosuvastatin.  Continue Entresto.  He denies chest pain. He was advised to notify our office if symptoms develop.  Essential hypertension:BP on the low side. He is having orthostatic dizziness.  Will decrease toprol to 25mg daily.  Continue other medications.  The patient was instructed to monitor BP at home and to notify our office if systolic BP is consistently greater than 130-135 systolic.  Goal BP is 130-135/70-80.  6.   Hyperlipidemia: Continue statin.  Recent lipid panel shows good control.  Will            periodically review lipid panel.    Return in about 6 months (around 12/16/2025).    Andre Witt  reports that he has never smoked. He has never used smokeless tobacco.      Advance Care Planning  ACP discussion was declined by the patient. Patient does not have an advance directive, declines further assistance    MEDS ORDERED DURING VISIT:  New  Medications Ordered This Visit   Medications    metoprolol succinate XL (TOPROL-XL) 25 MG 24 hr tablet     Sig: Take 1 tablet by mouth Daily.     Dispense:  90 tablet     Refill:  3    empagliflozin (JARDIANCE) 10 MG tablet tablet     Sig: Take 1 tablet by mouth Daily.     Dispense:  90 tablet     Refill:  3       DISCONTINUED MEDS DURING VISIT:   Medications Discontinued During This Encounter   Medication Reason    metoprolol succinate XL (TOPROL-XL) 50 MG 24 hr tablet     metoprolol succinate XL (TOPROL-XL) 25 MG 24 hr tablet     empagliflozin (JARDIANCE) 10 MG tablet tablet Reorder          This document has been electronically signed by PEPPER Vidales  June 16, 2025 12:04 EDT    Dictated Utilizing Dragon Dictation: Part of this note may be an electronic transcription/translation of spoken language to printed text using the Dragon Dictation System

## 2025-06-18 ENCOUNTER — TELEPHONE (OUTPATIENT)
Dept: CARDIOLOGY | Facility: CLINIC | Age: 65
End: 2025-06-18
Payer: MEDICARE

## 2025-06-18 RX ORDER — SACUBITRIL AND VALSARTAN 24; 26 MG/1; MG/1
1 TABLET, FILM COATED ORAL 2 TIMES DAILY
Qty: 28 TABLET | Refills: 0 | COMMUNITY
Start: 2025-06-18

## 2025-06-18 NOTE — TELEPHONE ENCOUNTER
Prior authorization request not received. Prior auth created and submitted through Cover My Meds.    Message from plan...  Available without authorization. The member is able to fill the requested drug at the pharmacy.     Pharmacy reports no auth required however no high copay.     Samples set aside for patient. He will  one day this week.

## 2025-07-01 RX ORDER — LOSARTAN POTASSIUM 25 MG/1
25 TABLET ORAL DAILY
Qty: 90 TABLET | Refills: 3 | OUTPATIENT
Start: 2025-07-01

## 2025-07-15 ENCOUNTER — TELEPHONE (OUTPATIENT)
Dept: CARDIOLOGY | Facility: CLINIC | Age: 65
End: 2025-07-15
Payer: MEDICARE

## 2025-07-15 RX ORDER — SACUBITRIL AND VALSARTAN 24; 26 MG/1; MG/1
1 TABLET, FILM COATED ORAL 2 TIMES DAILY
Qty: 60 TABLET | Refills: 11 | Status: SHIPPED | OUTPATIENT
Start: 2025-07-15 | End: 2025-07-21

## 2025-07-15 NOTE — TELEPHONE ENCOUNTER
Patient left a message requesting samples of Entresto. LVM for patient to call back    RELAY:    Entresto has gone generic and we can no longer get samples of the medication. Would the patient like a new prescription sent to his pharmacy?

## 2025-07-21 ENCOUNTER — TELEPHONE (OUTPATIENT)
Dept: CARDIOLOGY | Facility: CLINIC | Age: 65
End: 2025-07-21
Payer: MEDICARE

## 2025-07-21 RX ORDER — VALSARTAN 40 MG/1
40 TABLET ORAL DAILY
Qty: 30 TABLET | Refills: 11 | Status: SHIPPED | OUTPATIENT
Start: 2025-07-21

## 2025-07-21 NOTE — TELEPHONE ENCOUNTER
Patient unable to afford Entresto at this time. He would like to have it changed with new script to [facility] in [place]. He was instructed to monitor BP and call with consistent highs/lows for possible adjustments. Will attempt Entresto generic once insurance changes preferred formulary at beginning of year of when price is adjusted for generic.

## 2025-07-21 NOTE — TELEPHONE ENCOUNTER
Stop Entresto and change to Valsartan 40mg daily.  Have him monitor BP and call the office if there are issues.

## 2025-07-29 RX ORDER — ROSUVASTATIN CALCIUM 40 MG/1
40 TABLET, COATED ORAL DAILY
Qty: 90 TABLET | Refills: 3 | Status: SHIPPED | OUTPATIENT
Start: 2025-07-29

## 2025-08-04 RX ORDER — METOPROLOL SUCCINATE 50 MG/1
50 TABLET, EXTENDED RELEASE ORAL DAILY
Qty: 90 TABLET | Refills: 3 | OUTPATIENT
Start: 2025-08-04

## 2025-08-04 RX ORDER — CLOPIDOGREL BISULFATE 75 MG/1
75 TABLET ORAL DAILY
Qty: 90 TABLET | Refills: 3 | Status: SHIPPED | OUTPATIENT
Start: 2025-08-04